# Patient Record
Sex: FEMALE | Race: WHITE | NOT HISPANIC OR LATINO | Employment: UNEMPLOYED | URBAN - METROPOLITAN AREA
[De-identification: names, ages, dates, MRNs, and addresses within clinical notes are randomized per-mention and may not be internally consistent; named-entity substitution may affect disease eponyms.]

---

## 2019-05-04 ENCOUNTER — HOSPITAL ENCOUNTER (EMERGENCY)
Facility: CLINIC | Age: 74
Discharge: HOME | End: 2019-05-04
Attending: EMERGENCY MEDICINE | Admitting: EMERGENCY MEDICINE
Payer: COMMERCIAL

## 2019-05-04 VITALS
DIASTOLIC BLOOD PRESSURE: 79 MMHG | TEMPERATURE: 97.5 F | RESPIRATION RATE: 16 BRPM | WEIGHT: 115.6 LBS | OXYGEN SATURATION: 98 % | SYSTOLIC BLOOD PRESSURE: 137 MMHG

## 2019-05-04 DIAGNOSIS — H00.014 HORDEOLUM EXTERNUM OF LEFT UPPER EYELID: ICD-10-CM

## 2019-05-04 DIAGNOSIS — L03.213 PRESEPTAL CELLULITIS: ICD-10-CM

## 2019-05-04 PROCEDURE — 99283 EMERGENCY DEPT VISIT LOW MDM: CPT | Performed by: EMERGENCY MEDICINE

## 2019-05-04 PROCEDURE — 99283 EMERGENCY DEPT VISIT LOW MDM: CPT | Mod: Z6 | Performed by: EMERGENCY MEDICINE

## 2019-05-04 RX ORDER — NEOMYCIN SULFATE, POLYMYXIN B SULFATE, AND DEXAMETHASONE 3.5; 10000; 1 MG/G; [USP'U]/G; MG/G
0.5 OINTMENT OPHTHALMIC 3 TIMES DAILY
Qty: 1 TUBE | Refills: 0 | Status: SHIPPED | OUTPATIENT
Start: 2019-05-04

## 2019-05-04 RX ORDER — CEPHALEXIN 500 MG/1
500 CAPSULE ORAL 3 TIMES DAILY
Qty: 21 CAPSULE | Refills: 0 | Status: SHIPPED | OUTPATIENT
Start: 2019-05-04 | End: 2019-05-11

## 2019-05-04 NOTE — ED AVS SNAPSHOT
[facility], [place], Emergency Department  [address] [address]  Phone:  [phone]                                    Yulisa Rodriguez   MRN: [id number]    Department:  [facility], Emergency Department   Date of Visit:  5/4/2019           After Visit Summary Signature Page    I have received my discharge instructions, and my questions have been answered. I have discussed any challenges I see with this plan with the nurse or doctor.    ..........................................................................................................................................  Patient/Patient Representative Signature      ..........................................................................................................................................  Patient Representative Print Name and Relationship to Patient    ..................................................               ................................................  Date                                   Time    ..........................................................................................................................................  Reviewed by Signature/Title    ...................................................              ..............................................  Date                                               Time          22EPIC Rev 08/18

## 2019-05-04 NOTE — ED TRIAGE NOTES
"Pt went tot [facility] and was seen for eye irritation/swelling. Due to the additional cheek swelling she was asked to go to the E.D.  "

## 2019-05-05 NOTE — CONSULTS
OPHTHALMOLOGY CONSULT NOTE  05/04/19    Patient: Yulisa Rodriguez      HISTORY OF PRESENTING ILLNESS:     Yulisa Rodriguez is a 74 year old female who presents after being seen by urgent care clinic earlier today sent here w/ concern for left pre-septal cellulitis. Pt reports her left upper eyelid began to swell with erythema 3 days ago on 5/1/19. She sought care today d/t left upper cheek involvement. She has had no eye pain, pain with eye movements, vision changes, flashes of light, or floaters. Her past ocular history is notable for refractive error and cataracts each eye. No prior eye surgery or trauma. No diabetes, HTN, immunosuppression treatment or history. No AMD or glaucoma.      Focused review of systems were otherwise negative except for that which has been stated above.      OCULAR/MEDICAL/SURGICAL HISTORIES:     Past Ocular History:  See HPI    History reviewed. No pertinent past medical history.    No past surgical history on file.    EXAMINATION:     Visual Acuity (assessed with near card): Right Eye 20/40 +1; Left Eye 20/40 -2   Pupils: Equal and reactive to light and accomodation with no afferent pupillary defect.    Intraocular Pressure: RE  9 ; LE 13  mmHg by tonopen (<5% error).   Motility: RE Full; LE Full    External/Slit Lamp Exam   RIGHT EYE   Lids/Lashes: No Abnormality   Conj/Sclera: White and Quiet   Cornea:  Clear   Ant Chamber:  Deep and Quiet   Iris: Round and Reactive   Lens: Clear  LEFT   External: left upper cheek w/ mild erythema/edema   Lids/lashes: JEWELS erythema and edema. Central upper eyelid stye at margin. No lesion noted upon eversion.   Conj/Sclera: White and Quiet   Cornea:  Clear   Ant Chamber:  Deep and Quiet   Iris: Round and Reactive   Lens:Clear    Dilated Fundus Exam  Eyes Dilated? Yes  Time: 7:30 With Phenylephrine 2.5% and Mydriacyl 1%    (Normally, dilation with the above lasts about 4-6 hours)  RIGHT:   Anterior Vitreous: Clear   Media: Clear   Optic Nerve:  Normal Contours and Size   Cup to Disc Ratio: 0.3   Macula: Flat and No Pigment Abnormality   Vessels: Normal Caliber and Distribution   Retinal Periphery: No Holes or Tears Identified  LEFT:   Anterior Vitreous: Clear   Media: Clear   Optic Nerve: Normal Contours and Size   Cup to Disc Ratio: 0.3   Macula: Flat and No Pigment Abnormality   Vessels: Normal Caliber and Distribution   Retinal Periphery: No Holes or Tears Identified    Labs/Studies/Imaging Performed  NA     ASSESSMENT/PLAN:     Yulisa Rodriguez is a 74 year old female who presents with likely mild left preseptal cellulitis.    Likely early preseptal cellulitis 2/2 stye  Orbital cellulitis very unlikely given history and exam.   - Maxitrol ointment TID to left eye  - Oral Abx per ED staff  - Warm compresses TID for 5-10 minute  - Clinic Monday;  will call w/ time. Pt will call/return to the ED for any new or worsening symptoms.    It is our pleasure to participate in this patient's care and treatment. Please contact us with any further questions or concerns.      Mustapha Birmingham MD   Ophthalmology Residency, PGY-2  Pager: [phone]    Discussed and examined w/ Dr Mora, PGY-3    Teaching Statement  I did not examine the patient, but was available to see the patient if needed. I have discussed the case with the resident and the assessment and plan as documented seems reasonable to me.    Crystal Pugh MD  Comprehensive Ophthalmology & Ocular Pathology  Department of Ophthalmology and Visual Neurosciences  [email or web address]  Pager [phone]

## 2019-05-05 NOTE — DISCHARGE INSTRUCTIONS
FOLLOW-UP:  Please make an appointment to follow up with:  - Eye Clinic (phone: [phone])   - The Eye Clinic will call you on Monday morning to confirm an appointment time for Monday afternoon. If you do not hear from them by 11am, please call to ensure they make you an appointment.     PRESCRIPTIONS / MEDICATIONS:  - Please used the prescribed eye ointment, three times a day in the affected eye.   - Please take the prescribed oral antibiotics until completed or told otherwise by a medical provider. We discussed the dosing with our Pharmacist and they believe this to be safe dosage for you, but notify a medical provider immediately if you have any abnormal symptoms, etc.    RETURN TO THE EMERGENCY DEPARTMENT  Return to the Emergency Department at any time for new/worsening symptoms.

## 2019-05-05 NOTE — ED PROVIDER NOTES
[place] EMERGENCY DEPARTMENT ([facility])  5/04/19 ED 3    History     Chief Complaint   Patient presents with     Facial Swelling     Left eye/cheek swelling     The history is provided by the patient.     Yulisa Rodriguez is a 74 year old female generally healthy, presents for evaluation of left eye/facial findings from [facility] clinic.     Patient reports that she began noticing what she thought to be a stye around Wednesday.  She had been doing well and then today noticed some increased redness and swelling around her left eyelid as well as the left side of her face below her eye.  She felt that it was warm to the touch and so presented to [facility] for evaluation.  It was there based upon initial evaluation they thought she should come here to be evaluated to ensure that there was no more significant infection outside of a regular cellulitis.  Patient believes that the erythema on her left face is actually improving somewhat from earlier today when she was initially evaluated at the outpatient clinic. Patient reports that she has not had any significant change in vision.      She occasionally uses contacts but last use of contacts was over the weekend, multiple days prior to this occurring.  She did not scratch her eye with contact removal, no debris, and no eye injuries to her knowledge.  As mentioned she did notice what seemed to be a stye beginning in the upper eyelid on Wednesday but otherwise no changes.  She has no pain with extraocular movements.  No blurred or double vision, no loss of vision, curtain coming over the eye, flashers, floaters, etc.  No headaches, no neck pain or stiffness, no fevers or chills.  No myalgias, no sensation of eye pressure, not immunocompromised, no kidney disease, no other new symptoms or complaints at this time.  Please see ROS for further details.    Acuity was 20/50 on the left, 20/30 on the right with patient saying that sounds about accurate  for her usually, corrected to 20/20 with glasses in both eyes.     This part of the document was transcribed by Luiza Gomes, Medical Scribe.      I have reviewed the Medications, Allergies, Past Medical and Surgical History, and Social History in the Epic system.    Review of Systems   Constitutional: Negative for fever.   HENT: Positive for facial swelling (left eyelid). Negative for congestion, drooling, ear discharge, ear pain, nosebleeds, sinus pressure, sinus pain, trouble swallowing and voice change.    Eyes: Negative for photophobia, pain, discharge, redness and visual disturbance.        Eyelid swelling, mild discomfort/irritation   All other systems reviewed and are negative.      Physical Exam   BP: 132/65  Heart Rate: 77  Temp: 98  F (36.7  C)  Resp: 14  Weight: 52.4 kg (115 lb 9.6 oz)  SpO2: 98 %      Physical Exam  CONSTITUTIONAL: Well-developed and well-nourished. Awake and alert. Non-toxic appearance. No acute distress.   HENT:   - Head: Normocephalic and atraumatic. Left eyelid and left cheek below left eye mild erythema, warmth. No crepitus. No vescicles  - Ears: Hearing and external ear grossly normal. No drainage.   - Nose: Nose normal. No rhinorrhea. No epistaxis.   - Mouth/Throat: MMM  EYES: Stye midline upper lid margin, lid swelling and erythema/warmth, erythema below left eye. No notable injection. PERRL. EOMI w/o discomfort. No purulence, no chemosis.   NECK: Normal range of motion and phonation normal. Neck supple.  No tracheal deviation, no stridor. No edema or erythema noted. No meningeal findings.  CARDIOVASCULAR: Normal rate, regular rhythm and no appreciable abnormal heart sounds.  PULMONARY/CHEST: Normal work of breathing. No accessory muscle usage or stridor. No respiratory distress.    MUSCULOSKELETAL: Extremities warm and seemingly well perfused. No edema or calf tenderness.  NEUROLOGIC: Awake, alert. Not disoriented. She displays no atrophy and no tremor. Normal tone. No seizure  activity. GCS 15  SKIN: Skin is warm and dry.  No diaphoresis. No pallor. Mild erythema left face, see details above.  PSYCHIATRIC: Normal mood and affect. Speech and behavior normal. Thought processes linear. Cognition and memory are normal.        Assessments & Plan (with Medical Decision Making)     IMPRESSION: 74-year-old, generally healthy female, presenting with left eye/eyelid discomfort and facial erythema as described further above in HPI/ROS.  Clinically, patient appears nontoxic, NAD.  On the left she does appear to have a stye on the upper lid margin with some swelling there and some erythema of the left lid as well as some mild erythema below her left eye. The eye itself appears to be grossly WNL.  No significant erythema or injection, normal eye exam with normal extraocular movements. Normal/no abnormal findings on nondilated exam.     Appears to have may be some mild preseptal cellulitis though it sounds seem to be improving somewhat per patient report.  Based on her current presentation I think this is unlikely to be an orbital cellulitis.  Conveniently, the Ophthalmology team is here seeing another patient and especially given that she was sent here from another facility we will have them evaluate and weigh in as well.  Unless they have a high clinical suspicion, as my current suspicion is low for orbital cellulitis, I do not think that she needs emergent CT imaging and hopefully will just be able to use antibiotic ointment/drops as well as oral antibiotics for the findings but again will defer to evaluation from the Ophthalmology team as well.    PLAN: Ophthalmology consult    INTERVENTIONS:   - Ophthalmology consult, see their documentation for full details    RE-EVALUATION:  - Erythema actually looks perhaps mildly improved from when she initially arrived here today at time of discharge, to which patient agrees.  - Pt otherwise continues to do well here in the ED, no acute issues or apparent  concerning changes in vitals or clinical appearance.     DISCUSSIONS:  - w/ Ophthalmology: They have seen and evaluated the patient here in the ED.  They have performed full dilated exam as well.  I think this likely represents stye with some mild preseptal cellulitis as consistent with our initial evaluation as well.  They do not think that she needs any emergent imaging.  I think that she can be discharged to follow-up with her eye clinic on Monday which they have discussed thoroughly with her.  They recommend Maxitrol ointment 3 times daily as well as oral Keflex which were prescribed  - w/ Patient: I have reviewed the available findings, plan, need for close follow up, safety and strict return instructions with the patient and her guests. They expressed understanding and agreement with this plan. All questions answered to the best of our ability at this time.     DISPOSITION/PLANNING:  - IMPRESSION: Stye with preseptal cellulitis  - DISPOSITION: Discharged home  --- Follow-up: With Ophthalmology on Monday (Eye Clinic should call the patient but I provided the eye clinic phone number should she not hear from them by late morning, to be seen no later than Monday afternoon)  - RECOMMENDATIONS: Conservative symptom management, strict return instructions  --- Rx: Maxitrol ointment, oral Keflex      ______________________________________________________________________    This part of the document was transcribed by Luiza Gomes, Medical Scribe.           Medication List      Started    cephALEXin 500 MG capsule  Commonly known as:  KEFLEX  500 mg, Oral, 3 TIMES DAILY     neomycin-polymyxin-dexamethasone 3.5-71581-3.1 ophthalmic ointment  Commonly known as:  MAXITROL  0.5 inches, Left Eye, 3 TIMES DAILY            Final diagnoses:   Hordeolum externum of left upper eyelid   Preseptal cellulitis       5/4/2019   [facility], [place], EMERGENCY DEPARTMENT     Lily Hernadez MD  05/06/19 [id number]

## 2019-05-06 ENCOUNTER — TELEPHONE (OUTPATIENT)
Dept: OPHTHALMOLOGY | Facility: CLINIC | Age: 74
End: 2019-05-06

## 2019-05-06 ENCOUNTER — TELEPHONE (OUTPATIENT)
Dept: CALL CENTER | Age: 74
End: 2019-05-06

## 2019-05-06 ASSESSMENT — ENCOUNTER SYMPTOMS
SINUS PAIN: 0
VOICE CHANGE: 0
FEVER: 0
EYE DISCHARGE: 0
EYE PAIN: 0
PHOTOPHOBIA: 0
TROUBLE SWALLOWING: 0
EYE REDNESS: 0
SINUS PRESSURE: 0
FACIAL SWELLING: 1

## 2019-05-06 NOTE — TELEPHONE ENCOUNTER
Unable to reach patient.  Mailbox full.  I was able to leave message with the patient's daughter.  The patient may be seen tomorrow with Dr. Carrera or Thursday with Dr. Benitez. LM with triage phone line

## 2019-05-06 NOTE — TELEPHONE ENCOUNTER
I spoke to the patient who notes that she is flying out of town tomorrow.  She states her symptoms are much better.  She will schedule a follow up as soon as she arrives home.  She is grateful for the return phone call

## 2019-05-06 NOTE — TELEPHONE ENCOUNTER
[facility] Call Center    Phone Message    May a detailed message be left on voicemail: yes    Reason for Call: Other: Pt was seen in the ED for Hordeolum of Lt eye and was told to call the [facility] eye clinic to be seen this afternoon. She was given the phone no. to the clinic and was told that if she doesn't hear from the clinic by 11:00 am to call to schedule the appt.      Action Taken: Message routed to:  [facility] ([facility]): Please call her as soon as possible today to discuss and hopefully squeeze her in.

## 2024-09-18 ENCOUNTER — OFFICE VISIT (OUTPATIENT)
Dept: URGENT CARE | Facility: URGENT CARE | Age: 79
End: 2024-09-18
Payer: COMMERCIAL

## 2024-09-18 VITALS
OXYGEN SATURATION: 97 % | HEART RATE: 65 BPM | WEIGHT: 110 LBS | BODY MASS INDEX: 17.27 KG/M2 | RESPIRATION RATE: 16 BRPM | DIASTOLIC BLOOD PRESSURE: 82 MMHG | HEIGHT: 67 IN | SYSTOLIC BLOOD PRESSURE: 118 MMHG | TEMPERATURE: 97.4 F

## 2024-09-18 DIAGNOSIS — M26.622 TMJ TENDERNESS, LEFT: Primary | ICD-10-CM

## 2024-09-18 DIAGNOSIS — H92.02 LEFT EAR PAIN: ICD-10-CM

## 2024-09-18 DIAGNOSIS — H69.93 DYSFUNCTION OF BOTH EUSTACHIAN TUBES: ICD-10-CM

## 2024-09-18 PROCEDURE — 99203 OFFICE O/P NEW LOW 30 MIN: CPT | Performed by: INTERNAL MEDICINE

## 2024-09-18 RX ORDER — FLUTICASONE PROPIONATE 50 MCG
1 SPRAY, SUSPENSION (ML) NASAL DAILY
Qty: 16 G | Refills: 0 | Status: SHIPPED | OUTPATIENT
Start: 2024-09-18 | End: 2024-09-28

## 2024-09-18 NOTE — PROGRESS NOTES
"ASSESSMENT AND PLAN:      ICD-10-CM    1. TMJ tenderness, left  M26.622       2. Left ear pain  H92.02       3. Dysfunction of both eustachian tubes  H69.93 fluticasone (FLONASE) 50 MCG/ACT nasal spray        Patient Instructions       Ice.  May need moist heat.      ibuprofen 400 to 600 mg 3 x day with food  Or aleve 440 mg 2 x day with food    See dentist if not improved with conservative treatment   Return if symptoms worsen or fail to improve.        Christie Patel MD  [facility]    Subjective     Yulisa Rodriguez is a 79 year old who presents for Patient presents with:  Urgent Care: Left ear pain today, intense. Saw her pcp about 1 month ago and was given flonase. Pain is piercing. Would like a refill on the flonase    a new patient of [facility].        Onset of symptoms was today    Current and Associated symptoms: ear pain left  Treatment measures tried include None tried.  Predisposing factors include ear symptoms in past.      Review of Systems      Component  Ref Range & Units 2 mo ago   Sodium  135 - 144 mmol/L 141   Potassium  3.5 - 5.3 mmol/L 4.1   Chloride  98 - 107 mmol/L 101   CO2  21 - 31 mmol/L 30   Anion Gap  3 - 11 10   Glucose  70 - 99 mg/dL 115 High    BUN  7 - 25 mg/dL 17   Creatinine  0.60 - 1.20 mg/dL 0.81   eGFR  >=60 mL/min/1.73m2 75   Comment: Calculation based on the Chronic Kidney Disease Epidemiology Collaboration (CKD-EPI) equation refit without adjustment for race.   BUN/Creatinine Ratio 21.0   Calcium  8.6 - 10.3 mg/dL 9.8   AST (SGOT)  13 - 39 unit/L 22   ALT (SGPT)  7 - 52 unit/L 13   Alkaline Phosphatase  27 - 120 unit/L 80   Total Protein  6.1 - 7.9 g/dL 6.6   Albumin  3.5 - 5.7 g/dL 4.2   Total Bilirubin  0.3 - 1.0 mg/dL 0.9         Objective    /82   Pulse 65   Temp 97.4  F (36.3  C) (Temporal)   Resp 16   Ht 1.702 m (5' 7\")   Wt 49.9 kg (110 lb)   SpO2 97%   BMI 17.23 kg/m    Physical Exam  Vitals reviewed.   Constitutional:       " Appearance: Normal appearance.   HENT:      Right Ear: Tympanic membrane, ear canal and external ear normal.      Left Ear: Tympanic membrane, ear canal and external ear normal.      Ears:      Comments: left TMJ pain with guarding    Musculoskeletal:      Cervical back: Neck supple. No tenderness.   Lymphadenopathy:      Cervical: No cervical adenopathy.   Neurological:      Mental Status: She is alert.

## 2024-09-18 NOTE — PATIENT INSTRUCTIONS
Ice.  May need moist heat.      ibuprofen 400 to 600 mg 3 x day with food  Or aleve 440 mg 2 x day with food    See dentist if not improved with conservative treatment